# Patient Record
Sex: MALE | URBAN - NONMETROPOLITAN AREA
[De-identification: names, ages, dates, MRNs, and addresses within clinical notes are randomized per-mention and may not be internally consistent; named-entity substitution may affect disease eponyms.]

---

## 2021-12-16 ENCOUNTER — OFFICE VISIT (OUTPATIENT)
Dept: URBAN - NONMETROPOLITAN AREA CLINIC 14 | Facility: CLINIC | Age: 41
End: 2021-12-16
Payer: COMMERCIAL

## 2021-12-16 DIAGNOSIS — H52.13 MYOPIA, BILATERAL: Primary | ICD-10-CM

## 2021-12-16 PROCEDURE — 92004 COMPRE OPH EXAM NEW PT 1/>: CPT | Performed by: OPTOMETRIST

## 2021-12-16 ASSESSMENT — VISUAL ACUITY
OD: 20/30
OS: 20/30

## 2021-12-16 ASSESSMENT — INTRAOCULAR PRESSURE
OD: 21
OS: 18

## 2022-12-16 ENCOUNTER — OFFICE VISIT (OUTPATIENT)
Dept: URBAN - NONMETROPOLITAN AREA CLINIC 14 | Facility: CLINIC | Age: 42
End: 2022-12-16
Payer: COMMERCIAL

## 2022-12-16 DIAGNOSIS — H52.13 MYOPIA, BILATERAL: Primary | ICD-10-CM

## 2022-12-16 PROCEDURE — 92014 COMPRE OPH EXAM EST PT 1/>: CPT | Performed by: OPTOMETRIST

## 2022-12-16 ASSESSMENT — VISUAL ACUITY
OS: 20/50
OD: 20/40

## 2022-12-16 ASSESSMENT — INTRAOCULAR PRESSURE
OD: 11
OS: 13

## 2022-12-16 NOTE — IMPRESSION/PLAN
Impression: Myopia, bilateral: H52.13. Plan: Glasses Rx given. Discussed possible adaptation period.

## 2023-12-22 ENCOUNTER — OFFICE VISIT (OUTPATIENT)
Dept: URBAN - NONMETROPOLITAN AREA CLINIC 14 | Facility: CLINIC | Age: 43
End: 2023-12-22
Payer: COMMERCIAL

## 2023-12-22 DIAGNOSIS — H52.13 MYOPIA, BILATERAL: Primary | ICD-10-CM

## 2023-12-22 PROCEDURE — 92014 COMPRE OPH EXAM EST PT 1/>: CPT | Performed by: OPTOMETRIST

## 2023-12-22 ASSESSMENT — INTRAOCULAR PRESSURE
OS: 17
OD: 17

## 2023-12-22 ASSESSMENT — VISUAL ACUITY
OS: 20/40
OD: 20/40

## 2024-12-27 ENCOUNTER — OFFICE VISIT (OUTPATIENT)
Dept: URBAN - NONMETROPOLITAN AREA CLINIC 14 | Facility: CLINIC | Age: 44
End: 2024-12-27
Payer: COMMERCIAL

## 2024-12-27 DIAGNOSIS — H52.13 MYOPIA, BILATERAL: Primary | ICD-10-CM

## 2024-12-27 PROCEDURE — 92014 COMPRE OPH EXAM EST PT 1/>: CPT | Performed by: OPTOMETRIST

## 2024-12-27 ASSESSMENT — INTRAOCULAR PRESSURE
OD: 15
OS: 16

## 2024-12-27 ASSESSMENT — VISUAL ACUITY
OD: 20/25
OS: 20/25